# Patient Record
Sex: FEMALE | Race: WHITE | NOT HISPANIC OR LATINO | URBAN - METROPOLITAN AREA
[De-identification: names, ages, dates, MRNs, and addresses within clinical notes are randomized per-mention and may not be internally consistent; named-entity substitution may affect disease eponyms.]

---

## 2019-06-17 ENCOUNTER — OUTPATIENT (OUTPATIENT)
Dept: OUTPATIENT SERVICES | Facility: HOSPITAL | Age: 34
LOS: 1 days | Discharge: STILL ADMITTED OR ADMINISTRATIVE | End: 2019-06-17

## 2019-06-18 DIAGNOSIS — F32.9 MAJOR DEPRESSIVE DISORDER, SINGLE EPISODE, UNSPECIFIED: ICD-10-CM

## 2019-06-25 ENCOUNTER — INPATIENT (INPATIENT)
Facility: HOSPITAL | Age: 34
LOS: 11 days | Discharge: HOME | End: 2019-07-07
Attending: PSYCHIATRY & NEUROLOGY | Admitting: PSYCHIATRY & NEUROLOGY
Payer: COMMERCIAL

## 2019-06-25 VITALS
HEART RATE: 72 BPM | DIASTOLIC BLOOD PRESSURE: 92 MMHG | RESPIRATION RATE: 16 BRPM | OXYGEN SATURATION: 100 % | TEMPERATURE: 99 F | SYSTOLIC BLOOD PRESSURE: 132 MMHG

## 2019-06-25 DIAGNOSIS — F32.9 MAJOR DEPRESSIVE DISORDER, SINGLE EPISODE, UNSPECIFIED: ICD-10-CM

## 2019-06-25 LAB
ALBUMIN SERPL ELPH-MCNC: 4.4 G/DL — SIGNIFICANT CHANGE UP (ref 3.3–5)
ALP SERPL-CCNC: 78 U/L — SIGNIFICANT CHANGE UP (ref 40–120)
ALT FLD-CCNC: 18 U/L — SIGNIFICANT CHANGE UP (ref 4–33)
AMPHET UR-MCNC: NEGATIVE — SIGNIFICANT CHANGE UP
ANION GAP SERPL CALC-SCNC: 9 MMO/L — SIGNIFICANT CHANGE UP (ref 7–14)
APAP SERPL-MCNC: < 15 UG/ML — LOW (ref 15–25)
APPEARANCE UR: CLEAR — SIGNIFICANT CHANGE UP
AST SERPL-CCNC: 23 U/L — SIGNIFICANT CHANGE UP (ref 4–32)
BARBITURATES UR SCN-MCNC: NEGATIVE — SIGNIFICANT CHANGE UP
BASOPHILS # BLD AUTO: 0.02 K/UL — SIGNIFICANT CHANGE UP (ref 0–0.2)
BASOPHILS NFR BLD AUTO: 0.5 % — SIGNIFICANT CHANGE UP (ref 0–2)
BENZODIAZ UR-MCNC: NEGATIVE — SIGNIFICANT CHANGE UP
BILIRUB SERPL-MCNC: 0.5 MG/DL — SIGNIFICANT CHANGE UP (ref 0.2–1.2)
BILIRUB UR-MCNC: NEGATIVE — SIGNIFICANT CHANGE UP
BLOOD UR QL VISUAL: NEGATIVE — SIGNIFICANT CHANGE UP
BUN SERPL-MCNC: 13 MG/DL — SIGNIFICANT CHANGE UP (ref 7–23)
CALCIUM SERPL-MCNC: 9.5 MG/DL — SIGNIFICANT CHANGE UP (ref 8.4–10.5)
CANNABINOIDS UR-MCNC: NEGATIVE — SIGNIFICANT CHANGE UP
CHLORIDE SERPL-SCNC: 101 MMOL/L — SIGNIFICANT CHANGE UP (ref 98–107)
CO2 SERPL-SCNC: 29 MMOL/L — SIGNIFICANT CHANGE UP (ref 22–31)
COCAINE METAB.OTHER UR-MCNC: NEGATIVE — SIGNIFICANT CHANGE UP
COLOR SPEC: SIGNIFICANT CHANGE UP
CREAT SERPL-MCNC: 0.95 MG/DL — SIGNIFICANT CHANGE UP (ref 0.5–1.3)
EOSINOPHIL # BLD AUTO: 0.22 K/UL — SIGNIFICANT CHANGE UP (ref 0–0.5)
EOSINOPHIL NFR BLD AUTO: 5 % — SIGNIFICANT CHANGE UP (ref 0–6)
ETHANOL BLD-MCNC: < 10 MG/DL — SIGNIFICANT CHANGE UP
GLUCOSE SERPL-MCNC: 100 MG/DL — HIGH (ref 70–99)
GLUCOSE UR-MCNC: NEGATIVE — SIGNIFICANT CHANGE UP
HCG SERPL-ACNC: < 5 MIU/ML — SIGNIFICANT CHANGE UP
HCT VFR BLD CALC: 37.9 % — SIGNIFICANT CHANGE UP (ref 34.5–45)
HGB BLD-MCNC: 12.9 G/DL — SIGNIFICANT CHANGE UP (ref 11.5–15.5)
IMM GRANULOCYTES NFR BLD AUTO: 0.5 % — SIGNIFICANT CHANGE UP (ref 0–1.5)
KETONES UR-MCNC: NEGATIVE — SIGNIFICANT CHANGE UP
LEUKOCYTE ESTERASE UR-ACNC: NEGATIVE — SIGNIFICANT CHANGE UP
LYMPHOCYTES # BLD AUTO: 1.76 K/UL — SIGNIFICANT CHANGE UP (ref 1–3.3)
LYMPHOCYTES # BLD AUTO: 39.8 % — SIGNIFICANT CHANGE UP (ref 13–44)
MCHC RBC-ENTMCNC: 30.5 PG — SIGNIFICANT CHANGE UP (ref 27–34)
MCHC RBC-ENTMCNC: 34 % — SIGNIFICANT CHANGE UP (ref 32–36)
MCV RBC AUTO: 89.6 FL — SIGNIFICANT CHANGE UP (ref 80–100)
METHADONE UR-MCNC: NEGATIVE — SIGNIFICANT CHANGE UP
MONOCYTES # BLD AUTO: 0.41 K/UL — SIGNIFICANT CHANGE UP (ref 0–0.9)
MONOCYTES NFR BLD AUTO: 9.3 % — SIGNIFICANT CHANGE UP (ref 2–14)
NEUTROPHILS # BLD AUTO: 1.99 K/UL — SIGNIFICANT CHANGE UP (ref 1.8–7.4)
NEUTROPHILS NFR BLD AUTO: 44.9 % — SIGNIFICANT CHANGE UP (ref 43–77)
NITRITE UR-MCNC: NEGATIVE — SIGNIFICANT CHANGE UP
NRBC # FLD: 0.07 K/UL — SIGNIFICANT CHANGE UP (ref 0–0)
NRBC FLD-RTO: 1.6 — SIGNIFICANT CHANGE UP
OPIATES UR-MCNC: NEGATIVE — SIGNIFICANT CHANGE UP
OXYCODONE UR-MCNC: NEGATIVE — SIGNIFICANT CHANGE UP
PCP UR-MCNC: NEGATIVE — SIGNIFICANT CHANGE UP
PH UR: 8 — SIGNIFICANT CHANGE UP (ref 5–8)
PLATELET # BLD AUTO: 267 K/UL — SIGNIFICANT CHANGE UP (ref 150–400)
PMV BLD: 9.5 FL — SIGNIFICANT CHANGE UP (ref 7–13)
POTASSIUM SERPL-MCNC: 4 MMOL/L — SIGNIFICANT CHANGE UP (ref 3.5–5.3)
POTASSIUM SERPL-SCNC: 4 MMOL/L — SIGNIFICANT CHANGE UP (ref 3.5–5.3)
PROT SERPL-MCNC: 7.4 G/DL — SIGNIFICANT CHANGE UP (ref 6–8.3)
PROT UR-MCNC: NEGATIVE — SIGNIFICANT CHANGE UP
RBC # BLD: 4.23 M/UL — SIGNIFICANT CHANGE UP (ref 3.8–5.2)
RBC # FLD: 12.2 % — SIGNIFICANT CHANGE UP (ref 10.3–14.5)
SALICYLATES SERPL-MCNC: < 5 MG/DL — LOW (ref 15–30)
SODIUM SERPL-SCNC: 139 MMOL/L — SIGNIFICANT CHANGE UP (ref 135–145)
SP GR SPEC: 1.01 — SIGNIFICANT CHANGE UP (ref 1–1.04)
TSH SERPL-MCNC: 2.26 UIU/ML — SIGNIFICANT CHANGE UP (ref 0.27–4.2)
UROBILINOGEN FLD QL: NORMAL — SIGNIFICANT CHANGE UP
WBC # BLD: 4.42 K/UL — SIGNIFICANT CHANGE UP (ref 3.8–10.5)
WBC # FLD AUTO: 4.42 K/UL — SIGNIFICANT CHANGE UP (ref 3.8–10.5)

## 2019-06-25 PROCEDURE — 99285 EMERGENCY DEPT VISIT HI MDM: CPT | Mod: GC

## 2019-06-25 RX ORDER — DULOXETINE HYDROCHLORIDE 30 MG/1
20 CAPSULE, DELAYED RELEASE ORAL DAILY
Refills: 0 | Status: DISCONTINUED | OUTPATIENT
Start: 2019-06-26 | End: 2019-06-28

## 2019-06-25 RX ORDER — CLONAZEPAM 1 MG
0.5 TABLET ORAL ONCE
Refills: 0 | Status: DISCONTINUED | OUTPATIENT
Start: 2019-06-25 | End: 2019-06-25

## 2019-06-25 RX ORDER — CLONAZEPAM 1 MG
0.5 TABLET ORAL THREE TIMES A DAY
Refills: 0 | Status: DISCONTINUED | OUTPATIENT
Start: 2019-06-26 | End: 2019-07-02

## 2019-06-25 RX ORDER — DEXTROAMPHETAMINE SACCHARATE, AMPHETAMINE ASPARTATE, DEXTROAMPHETAMINE SULFATE AND AMPHETAMINE SULFATE 1.875; 1.875; 1.875; 1.875 MG/1; MG/1; MG/1; MG/1
10 TABLET ORAL
Refills: 0 | Status: DISCONTINUED | OUTPATIENT
Start: 2019-06-26 | End: 2019-07-02

## 2019-06-25 RX ORDER — ESCITALOPRAM OXALATE 10 MG/1
5 TABLET, FILM COATED ORAL DAILY
Refills: 0 | Status: DISCONTINUED | OUTPATIENT
Start: 2019-06-26 | End: 2019-06-27

## 2019-06-25 RX ORDER — PROPRANOLOL HCL 160 MG
40 CAPSULE, EXTENDED RELEASE 24HR ORAL
Refills: 0 | Status: DISCONTINUED | OUTPATIENT
Start: 2019-06-26 | End: 2019-07-07

## 2019-06-25 RX ADMIN — Medication 0.5 MILLIGRAM(S): at 21:11

## 2019-06-25 NOTE — ED ADULT NURSE NOTE - OBJECTIVE STATEMENT
Patient presents from MD Franca Allen office for SI, depression and psychiatric evaluation. Recently had thoughts of wanting to drinking antifreeze last week.

## 2019-06-25 NOTE — ED BEHAVIORAL HEALTH ASSESSMENT NOTE - HPI (INCLUDE ILLNESS QUALITY, SEVERITY, DURATION, TIMING, CONTEXT, MODIFYING FACTORS, ASSOCIATED SIGNS AND SYMPTOMS)
Pt is a 34 year-old F with PPHx of MDD, PTSD, borderline personality disorder, with 2 prior psychiatric hospitalizations (most recent at [facility] 6/8/19 - 6/14/19 for depression), no prior suicide attempts, history of NSSIB via superficial cutting, no PMH, no history of substance abuse, who was brought in by EMS from therapist's office after reporting to her worsening symptoms of depression.  Pt seen and evaluated.  She reports that last week she went to [facility] crisis clinic to obtain refill of medications as she would not be able to see a psychiatrist at [facility] until July.  Reports that when she went to  medications at her pharmacy the next day Pt is a 34 year-old F with PPHx of MDD, PTSD, borderline personality disorder, with 2 prior psychiatric hospitalizations (most recent at [facility] 6/8/19 - 6/14/19 for depression), no prior suicide attempts, history of NSSIB via superficial cutting, no PMH, no history of substance abuse, who was brought in by EMS from therapist's office after reporting to her worsening symptoms of depression.  Pt seen and evaluated.  She reports that last week she went to [facility] crisis clinic to obtain refill of medications as she would not be able to see a psychiatrist at [facility] until July.  Reports that when she went to  medications at her pharmacy the next day she saw a bottle of antifreeze and recalled from a TV show that drinking antifreeze "would make you sick" and so she had thoughts of drinking antifreeze with purpose of becoming sick to be hospitalized however decided against it after reading that it could cause blindness.  Reports she attended group therapy at [facility] the following day, was crying a lot and spoke to therapist who recommended she go to ER,  had informed that he would take her however they went home and went to bed.  Reports she spent weekend at "[employer]" festival which she reports enjoying however that night [facility] as she continued to feel depressed. Pt is a 34 year-old F with PPHx of MDD, PTSD, borderline personality disorder, with 2 prior psychiatric hospitalizations (most recent at [facility] 6/8/19 - 6/14/19 for depression), no prior suicide attempts, history of NSSIB via superficial cutting, no PMH, no history of substance abuse, who was brought in by EMS from therapist's office after reporting to her worsening symptoms of depression.  Pt seen and evaluated.  She reports that last week she went to [facility] crisis clinic to obtain refill of medications as she would not be able to see a psychiatrist at [facility] until July.  Reports that when she went to  medications at her pharmacy the next day she saw a bottle of antifreeze and recalled from a TV show that drinking antifreeze "would make you sick" and so she had thoughts of drinking antifreeze with purpose of becoming sick to be hospitalized however decided against it after reading that it could cause blindness.  Reports she attended group therapy at [facility] the following day, was crying a lot and spoke to therapist who recommended she go to ER,  had informed that he would take her however they went home and went to bed.  Reports she spent weekend at "[employer]" festival which she reports enjoying however that night called Pending sale to [facility] WELL as she continued to feel depressed.  Reports she saw her therapist today and informed her of events of last week and how she feels unsupported by  and therapist informed her she felt she should go to ER.  In ER pt denies passive and active SI/I/P; reports intermittent passive suicidal ideation in last 7 days.  Reports initial insomnia and decreased appetite as well.    See ED Behavioral Health note for collateral from therapist.  Briefly, therapist has concerns for patient's safety and feels she has decompensated in last week.

## 2019-06-25 NOTE — ED ADULT NURSE REASSESSMENT NOTE - NS ED NURSE REASSESS COMMENT FT1
Patient refusing to change into gown and pants provided for patient protocol , spoke with MIRELLA Billy and patient will be seen in [facility] with PES at bedside currently. MD Redmnod aware.
Patient transported to [address] via EMS, safety maintained.
No

## 2019-06-25 NOTE — ED BEHAVIORAL HEALTH ASSESSMENT NOTE - OTHER PAST PSYCHIATRIC HISTORY (INCLUDE DETAILS REGARDING ONSET, COURSE OF ILLNESS, INPATIENT/OUTPATIENT TREATMENT)
2 prior psychiatric hospitalizations (first in February 2019 at [place], second 6/8/19-6/14/19 at [facility]).  No prior suicide attempts.  Currently in outpt treatment at [facility], however has not been assigned a psychiatrist yet.

## 2019-06-25 NOTE — ED BEHAVIORAL HEALTH ASSESSMENT NOTE - CASE SUMMARY
35 yo F hx of depression and borderline personality disorder, hx of NSSIB, 2 prior inpatient psychiatric hospitalizations.  Pt presented to ED on recommendation of therapist for inpatient psychiatric hospitalization.  Writer spoke directly with therapist who reports patient has been expressing intermittent suicidal ideation with ambivalent intent and purchased antifreeze.  Pt has been spending evenings holding knives with thoughts of cutting self.  Patient reported to staff in ED that she only looked at antifreeze at the store.  There are various inconsistencies in patient's report and therapist confirms that patient is often inconsistent.  Therapist is concerned for patient's safety at this time.  Pt has been unable to sustain stable, good mood with absence of suicidal ideation and consistent safety planning.  In the ED patient endorses fluctuating moods and at times wanting to "disappear."  She is unable to engage in safety planning and has very limited social supports. Consideration was given to higher level of care in form of partial hospitalization however there is a 3-week wait list.   Given patient's instability, suicidality, and absence of benefit from outpatient level of care, she presents an imminent risk to herself and requires acute inpatient psychiatric hospitalization for safety and stabilization at this time.

## 2019-06-25 NOTE — ED PROVIDER NOTE - OBJECTIVE STATEMENT
34 yr old female with PMH of psych hx presents with SI on Wednesday.  States was at store and saw antifreeze and read back where it said ingestion would be fatal or lead to blindness.  She said based on blindness she "nixed" that idea.  States that night cut her wrist.  Went to group the next day where she spoke to therapist about it who recommended she come to ER which she did not.  Brought up to therapist today who told her to come in.  Denies SI currently.

## 2019-06-25 NOTE — ED BEHAVIORAL HEALTH ASSESSMENT NOTE - DESCRIPTION
Anxious, but cooperative.  Klonopin 0.5mg PO x1 given for anxiety.    Vital Signs Last 24 Hrs  T(C): 37.1 (25 Jun 2019 17:26), Max: 37.1 (25 Jun 2019 17:26)  T(F): 98.7 (25 Jun 2019 17:26), Max: 98.7 (25 Jun 2019 17:26)  HR: 72 (25 Jun 2019 17:26) (72 - 72)  BP: 132/92 (25 Jun 2019 17:26) (132/92 - 132/92)  BP(mean): --  RR: 16 (25 Jun 2019 17:26) (16 - 16)  SpO2: 100% (25 Jun 2019 17:26) (100% - 100%) Denies Currently lives with .  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

## 2019-06-25 NOTE — ED ADULT NURSE NOTE - NSIMPLEMENTINTERV_GEN_ALL_ED
Implemented All Universal Safety Interventions:  [place] to call system. Call bell, personal items and telephone within reach. Instruct patient to call for assistance. Room bathroom lighting operational. Non-slip footwear when patient is off stretcher. Physically safe environment: no spills, clutter or unnecessary equipment. Stretcher in lowest position, wheels locked, appropriate side rails in place.

## 2019-06-25 NOTE — ED BEHAVIORAL HEALTH ASSESSMENT NOTE - RISK ASSESSMENT
Pt with high risk of harm to self given recent suicidal ideation with plan to drink antifreeze and inability to care for self, poor social supports, not engaged in work or school.

## 2019-06-25 NOTE — ED PROVIDER NOTE - CLINICAL SUMMARY MEDICAL DECISION MAKING FREE TEXT BOX
no acute medical complaints; given SI last week and what is what prompted her to get sent in will have psych attending evaluate the pt

## 2019-06-25 NOTE — ED BEHAVIORAL HEALTH ASSESSMENT NOTE - SUMMARY
Pt is a 34 year-old F with PPHx of MDD, PTSD, borderline personality disorder, with 2 prior psychiatric hospitalizations (most recent at [facility] 6/8/19 - 6/14/19 for depression), no prior suicide attempts, history of NSSIB via superficial cutting, no PMH, no history of substance abuse, who was brought in by EMS from therapist's office after reporting to her worsening symptoms of depression since pt was discharged from [facility], reports symptoms of insomnia and poor appetite with fleeting suicidal ideation and reporting inconsistent story compared to report received by therapist who confirms pt repeatedly changes story and confirmed pt told her that she was in possession of antifreeze.  Has concerns for safety.  Case discussed with attending Dr. Lauren.  Pt to be hospitalized for stabilization and safety.
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
## 2019-06-25 NOTE — ED BEHAVIORAL HEALTH ASSESSMENT NOTE - CURRENT MEDICATION
Adderall 10mg TID, Propranolol 40mg BID, Cymbalta 20mg daily, Wellbutrin XL 300mg daily, Lexapro 5mg daily, Klonopin 0.5mg TID Adderall 10mg TID, Propranolol 40mg BID, Cymbalta 20mg daily, Wellbutrin XL 300mg daily, Lexapro 5mg daily, Klonopin 0.5mg TID (confirmed by ISTOP).
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
## 2019-06-26 PROCEDURE — 99222 1ST HOSP IP/OBS MODERATE 55: CPT

## 2019-06-26 RX ADMIN — DEXTROAMPHETAMINE SACCHARATE, AMPHETAMINE ASPARTATE, DEXTROAMPHETAMINE SULFATE AND AMPHETAMINE SULFATE 10 MILLIGRAM(S): 1.875; 1.875; 1.875; 1.875 TABLET ORAL at 09:15

## 2019-06-26 RX ADMIN — Medication 0.5 MILLIGRAM(S): at 12:17

## 2019-06-26 RX ADMIN — Medication 0.5 MILLIGRAM(S): at 09:15

## 2019-06-26 RX ADMIN — DULOXETINE HYDROCHLORIDE 20 MILLIGRAM(S): 30 CAPSULE, DELAYED RELEASE ORAL at 09:15

## 2019-06-26 RX ADMIN — Medication 40 MILLIGRAM(S): at 09:15

## 2019-06-26 RX ADMIN — ESCITALOPRAM OXALATE 5 MILLIGRAM(S): 10 TABLET, FILM COATED ORAL at 09:15

## 2019-06-26 RX ADMIN — Medication 40 MILLIGRAM(S): at 22:19

## 2019-06-26 RX ADMIN — DEXTROAMPHETAMINE SACCHARATE, AMPHETAMINE ASPARTATE, DEXTROAMPHETAMINE SULFATE AND AMPHETAMINE SULFATE 10 MILLIGRAM(S): 1.875; 1.875; 1.875; 1.875 TABLET ORAL at 12:16

## 2019-06-26 RX ADMIN — DEXTROAMPHETAMINE SACCHARATE, AMPHETAMINE ASPARTATE, DEXTROAMPHETAMINE SULFATE AND AMPHETAMINE SULFATE 10 MILLIGRAM(S): 1.875; 1.875; 1.875; 1.875 TABLET ORAL at 16:33

## 2019-06-26 RX ADMIN — Medication 0.5 MILLIGRAM(S): at 22:18

## 2019-06-27 PROCEDURE — 99232 SBSQ HOSP IP/OBS MODERATE 35: CPT

## 2019-06-27 RX ORDER — BUPROPION HYDROCHLORIDE 150 MG/1
300 TABLET, EXTENDED RELEASE ORAL ONCE
Refills: 0 | Status: COMPLETED | OUTPATIENT
Start: 2019-06-27 | End: 2019-06-27

## 2019-06-27 RX ORDER — BUPROPION HYDROCHLORIDE 150 MG/1
300 TABLET, EXTENDED RELEASE ORAL DAILY
Refills: 0 | Status: DISCONTINUED | OUTPATIENT
Start: 2019-06-28 | End: 2019-07-07

## 2019-06-27 RX ORDER — SERTRALINE 25 MG/1
12.5 TABLET, FILM COATED ORAL DAILY
Refills: 0 | Status: DISCONTINUED | OUTPATIENT
Start: 2019-06-28 | End: 2019-06-28

## 2019-06-27 RX ADMIN — DEXTROAMPHETAMINE SACCHARATE, AMPHETAMINE ASPARTATE, DEXTROAMPHETAMINE SULFATE AND AMPHETAMINE SULFATE 10 MILLIGRAM(S): 1.875; 1.875; 1.875; 1.875 TABLET ORAL at 09:49

## 2019-06-27 RX ADMIN — Medication 0.5 MILLIGRAM(S): at 21:44

## 2019-06-27 RX ADMIN — DEXTROAMPHETAMINE SACCHARATE, AMPHETAMINE ASPARTATE, DEXTROAMPHETAMINE SULFATE AND AMPHETAMINE SULFATE 10 MILLIGRAM(S): 1.875; 1.875; 1.875; 1.875 TABLET ORAL at 13:14

## 2019-06-27 RX ADMIN — Medication 0.5 MILLIGRAM(S): at 09:49

## 2019-06-27 RX ADMIN — BUPROPION HYDROCHLORIDE 300 MILLIGRAM(S): 150 TABLET, EXTENDED RELEASE ORAL at 13:14

## 2019-06-27 RX ADMIN — DULOXETINE HYDROCHLORIDE 20 MILLIGRAM(S): 30 CAPSULE, DELAYED RELEASE ORAL at 09:49

## 2019-06-27 RX ADMIN — Medication 40 MILLIGRAM(S): at 09:49

## 2019-06-27 RX ADMIN — Medication 40 MILLIGRAM(S): at 21:44

## 2019-06-27 RX ADMIN — Medication 0.5 MILLIGRAM(S): at 13:14

## 2019-06-27 RX ADMIN — DEXTROAMPHETAMINE SACCHARATE, AMPHETAMINE ASPARTATE, DEXTROAMPHETAMINE SULFATE AND AMPHETAMINE SULFATE 10 MILLIGRAM(S): 1.875; 1.875; 1.875; 1.875 TABLET ORAL at 17:16

## 2019-06-27 RX ADMIN — ESCITALOPRAM OXALATE 5 MILLIGRAM(S): 10 TABLET, FILM COATED ORAL at 09:49
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
## 2019-06-28 PROCEDURE — 99232 SBSQ HOSP IP/OBS MODERATE 35: CPT

## 2019-06-28 RX ORDER — SERTRALINE 25 MG/1
50 TABLET, FILM COATED ORAL DAILY
Refills: 0 | Status: DISCONTINUED | OUTPATIENT
Start: 2019-06-30 | End: 2019-07-02

## 2019-06-28 RX ORDER — SERTRALINE 25 MG/1
25 TABLET, FILM COATED ORAL DAILY
Refills: 0 | Status: COMPLETED | OUTPATIENT
Start: 2019-06-29 | End: 2019-06-29

## 2019-06-28 RX ADMIN — DEXTROAMPHETAMINE SACCHARATE, AMPHETAMINE ASPARTATE, DEXTROAMPHETAMINE SULFATE AND AMPHETAMINE SULFATE 10 MILLIGRAM(S): 1.875; 1.875; 1.875; 1.875 TABLET ORAL at 08:59

## 2019-06-28 RX ADMIN — Medication 0.5 MILLIGRAM(S): at 20:36

## 2019-06-28 RX ADMIN — DEXTROAMPHETAMINE SACCHARATE, AMPHETAMINE ASPARTATE, DEXTROAMPHETAMINE SULFATE AND AMPHETAMINE SULFATE 10 MILLIGRAM(S): 1.875; 1.875; 1.875; 1.875 TABLET ORAL at 12:25

## 2019-06-28 RX ADMIN — BUPROPION HYDROCHLORIDE 300 MILLIGRAM(S): 150 TABLET, EXTENDED RELEASE ORAL at 09:00

## 2019-06-28 RX ADMIN — DULOXETINE HYDROCHLORIDE 20 MILLIGRAM(S): 30 CAPSULE, DELAYED RELEASE ORAL at 09:00

## 2019-06-28 RX ADMIN — Medication 40 MILLIGRAM(S): at 09:00

## 2019-06-28 RX ADMIN — SERTRALINE 12.5 MILLIGRAM(S): 25 TABLET, FILM COATED ORAL at 09:00

## 2019-06-28 RX ADMIN — DEXTROAMPHETAMINE SACCHARATE, AMPHETAMINE ASPARTATE, DEXTROAMPHETAMINE SULFATE AND AMPHETAMINE SULFATE 10 MILLIGRAM(S): 1.875; 1.875; 1.875; 1.875 TABLET ORAL at 16:27

## 2019-06-28 RX ADMIN — Medication 40 MILLIGRAM(S): at 20:36

## 2019-06-28 RX ADMIN — Medication 0.5 MILLIGRAM(S): at 09:00

## 2019-06-28 RX ADMIN — Medication 0.5 MILLIGRAM(S): at 13:18

## 2019-06-29 PROCEDURE — 99231 SBSQ HOSP IP/OBS SF/LOW 25: CPT

## 2019-06-29 RX ADMIN — Medication 40 MILLIGRAM(S): at 09:16

## 2019-06-29 RX ADMIN — DEXTROAMPHETAMINE SACCHARATE, AMPHETAMINE ASPARTATE, DEXTROAMPHETAMINE SULFATE AND AMPHETAMINE SULFATE 10 MILLIGRAM(S): 1.875; 1.875; 1.875; 1.875 TABLET ORAL at 16:22

## 2019-06-29 RX ADMIN — Medication 0.5 MILLIGRAM(S): at 20:59

## 2019-06-29 RX ADMIN — DEXTROAMPHETAMINE SACCHARATE, AMPHETAMINE ASPARTATE, DEXTROAMPHETAMINE SULFATE AND AMPHETAMINE SULFATE 10 MILLIGRAM(S): 1.875; 1.875; 1.875; 1.875 TABLET ORAL at 09:15

## 2019-06-29 RX ADMIN — DEXTROAMPHETAMINE SACCHARATE, AMPHETAMINE ASPARTATE, DEXTROAMPHETAMINE SULFATE AND AMPHETAMINE SULFATE 10 MILLIGRAM(S): 1.875; 1.875; 1.875; 1.875 TABLET ORAL at 11:59

## 2019-06-29 RX ADMIN — Medication 0.5 MILLIGRAM(S): at 12:34

## 2019-06-29 RX ADMIN — SERTRALINE 25 MILLIGRAM(S): 25 TABLET, FILM COATED ORAL at 09:15

## 2019-06-29 RX ADMIN — Medication 40 MILLIGRAM(S): at 20:59

## 2019-06-29 RX ADMIN — Medication 0.5 MILLIGRAM(S): at 09:15

## 2019-06-29 RX ADMIN — BUPROPION HYDROCHLORIDE 300 MILLIGRAM(S): 150 TABLET, EXTENDED RELEASE ORAL at 09:15

## 2019-06-30 PROCEDURE — 99231 SBSQ HOSP IP/OBS SF/LOW 25: CPT

## 2019-06-30 RX ADMIN — DEXTROAMPHETAMINE SACCHARATE, AMPHETAMINE ASPARTATE, DEXTROAMPHETAMINE SULFATE AND AMPHETAMINE SULFATE 10 MILLIGRAM(S): 1.875; 1.875; 1.875; 1.875 TABLET ORAL at 09:45

## 2019-06-30 RX ADMIN — DEXTROAMPHETAMINE SACCHARATE, AMPHETAMINE ASPARTATE, DEXTROAMPHETAMINE SULFATE AND AMPHETAMINE SULFATE 10 MILLIGRAM(S): 1.875; 1.875; 1.875; 1.875 TABLET ORAL at 17:25

## 2019-06-30 RX ADMIN — Medication 1 MILLIGRAM(S): at 18:17

## 2019-06-30 RX ADMIN — Medication 40 MILLIGRAM(S): at 09:45

## 2019-06-30 RX ADMIN — Medication 0.5 MILLIGRAM(S): at 09:45

## 2019-06-30 RX ADMIN — Medication 0.5 MILLIGRAM(S): at 12:53

## 2019-06-30 RX ADMIN — DEXTROAMPHETAMINE SACCHARATE, AMPHETAMINE ASPARTATE, DEXTROAMPHETAMINE SULFATE AND AMPHETAMINE SULFATE 10 MILLIGRAM(S): 1.875; 1.875; 1.875; 1.875 TABLET ORAL at 12:53

## 2019-06-30 RX ADMIN — Medication 40 MILLIGRAM(S): at 21:02

## 2019-06-30 RX ADMIN — Medication 0.5 MILLIGRAM(S): at 21:03

## 2019-06-30 RX ADMIN — SERTRALINE 50 MILLIGRAM(S): 25 TABLET, FILM COATED ORAL at 09:45

## 2019-06-30 RX ADMIN — BUPROPION HYDROCHLORIDE 300 MILLIGRAM(S): 150 TABLET, EXTENDED RELEASE ORAL at 09:45

## 2019-07-01 PROCEDURE — 99232 SBSQ HOSP IP/OBS MODERATE 35: CPT

## 2019-07-01 RX ADMIN — SERTRALINE 50 MILLIGRAM(S): 25 TABLET, FILM COATED ORAL at 09:21

## 2019-07-01 RX ADMIN — Medication 0.5 MILLIGRAM(S): at 13:01

## 2019-07-01 RX ADMIN — Medication 0.5 MILLIGRAM(S): at 09:21

## 2019-07-01 RX ADMIN — DEXTROAMPHETAMINE SACCHARATE, AMPHETAMINE ASPARTATE, DEXTROAMPHETAMINE SULFATE AND AMPHETAMINE SULFATE 10 MILLIGRAM(S): 1.875; 1.875; 1.875; 1.875 TABLET ORAL at 09:21

## 2019-07-01 RX ADMIN — Medication 40 MILLIGRAM(S): at 09:21

## 2019-07-01 RX ADMIN — DEXTROAMPHETAMINE SACCHARATE, AMPHETAMINE ASPARTATE, DEXTROAMPHETAMINE SULFATE AND AMPHETAMINE SULFATE 10 MILLIGRAM(S): 1.875; 1.875; 1.875; 1.875 TABLET ORAL at 16:22

## 2019-07-01 RX ADMIN — Medication 40 MILLIGRAM(S): at 22:15

## 2019-07-01 RX ADMIN — DEXTROAMPHETAMINE SACCHARATE, AMPHETAMINE ASPARTATE, DEXTROAMPHETAMINE SULFATE AND AMPHETAMINE SULFATE 10 MILLIGRAM(S): 1.875; 1.875; 1.875; 1.875 TABLET ORAL at 11:34

## 2019-07-01 RX ADMIN — BUPROPION HYDROCHLORIDE 300 MILLIGRAM(S): 150 TABLET, EXTENDED RELEASE ORAL at 09:21

## 2019-07-01 RX ADMIN — Medication 0.5 MILLIGRAM(S): at 22:15

## 2019-07-02 PROCEDURE — 99232 SBSQ HOSP IP/OBS MODERATE 35: CPT

## 2019-07-02 RX ORDER — CLONAZEPAM 1 MG
0.5 TABLET ORAL THREE TIMES A DAY
Refills: 0 | Status: DISCONTINUED | OUTPATIENT
Start: 2019-07-02 | End: 2019-07-07

## 2019-07-02 RX ORDER — SERTRALINE 25 MG/1
25 TABLET, FILM COATED ORAL ONCE
Refills: 0 | Status: COMPLETED | OUTPATIENT
Start: 2019-07-02 | End: 2019-07-02

## 2019-07-02 RX ORDER — ACETAMINOPHEN/CAFFEINE 500MG-65MG
1 TABLET ORAL EVERY 8 HOURS
Refills: 0 | Status: DISCONTINUED | OUTPATIENT
Start: 2019-07-02 | End: 2019-07-07

## 2019-07-02 RX ORDER — DEXTROAMPHETAMINE SACCHARATE, AMPHETAMINE ASPARTATE, DEXTROAMPHETAMINE SULFATE AND AMPHETAMINE SULFATE 1.875; 1.875; 1.875; 1.875 MG/1; MG/1; MG/1; MG/1
10 TABLET ORAL
Refills: 0 | Status: DISCONTINUED | OUTPATIENT
Start: 2019-07-02 | End: 2019-07-07

## 2019-07-02 RX ORDER — SERTRALINE 25 MG/1
75 TABLET, FILM COATED ORAL DAILY
Refills: 0 | Status: DISCONTINUED | OUTPATIENT
Start: 2019-07-03 | End: 2019-07-03

## 2019-07-02 RX ADMIN — Medication 0.5 MILLIGRAM(S): at 21:08

## 2019-07-02 RX ADMIN — Medication 40 MILLIGRAM(S): at 22:21

## 2019-07-02 RX ADMIN — Medication 0.5 MILLIGRAM(S): at 08:44

## 2019-07-02 RX ADMIN — BUPROPION HYDROCHLORIDE 300 MILLIGRAM(S): 150 TABLET, EXTENDED RELEASE ORAL at 08:44

## 2019-07-02 RX ADMIN — DEXTROAMPHETAMINE SACCHARATE, AMPHETAMINE ASPARTATE, DEXTROAMPHETAMINE SULFATE AND AMPHETAMINE SULFATE 10 MILLIGRAM(S): 1.875; 1.875; 1.875; 1.875 TABLET ORAL at 08:44

## 2019-07-02 RX ADMIN — Medication 0.5 MILLIGRAM(S): at 13:06

## 2019-07-02 RX ADMIN — SERTRALINE 50 MILLIGRAM(S): 25 TABLET, FILM COATED ORAL at 08:44

## 2019-07-02 RX ADMIN — DEXTROAMPHETAMINE SACCHARATE, AMPHETAMINE ASPARTATE, DEXTROAMPHETAMINE SULFATE AND AMPHETAMINE SULFATE 10 MILLIGRAM(S): 1.875; 1.875; 1.875; 1.875 TABLET ORAL at 15:53

## 2019-07-02 RX ADMIN — Medication 40 MILLIGRAM(S): at 08:44

## 2019-07-02 RX ADMIN — DEXTROAMPHETAMINE SACCHARATE, AMPHETAMINE ASPARTATE, DEXTROAMPHETAMINE SULFATE AND AMPHETAMINE SULFATE 10 MILLIGRAM(S): 1.875; 1.875; 1.875; 1.875 TABLET ORAL at 11:52

## 2019-07-02 RX ADMIN — SERTRALINE 25 MILLIGRAM(S): 25 TABLET, FILM COATED ORAL at 13:06

## 2019-07-03 PROCEDURE — 99232 SBSQ HOSP IP/OBS MODERATE 35: CPT

## 2019-07-03 RX ORDER — SERTRALINE 25 MG/1
100 TABLET, FILM COATED ORAL DAILY
Refills: 0 | Status: DISCONTINUED | OUTPATIENT
Start: 2019-07-04 | End: 2019-07-05

## 2019-07-03 RX ADMIN — Medication 0.5 MILLIGRAM(S): at 21:50

## 2019-07-03 RX ADMIN — DEXTROAMPHETAMINE SACCHARATE, AMPHETAMINE ASPARTATE, DEXTROAMPHETAMINE SULFATE AND AMPHETAMINE SULFATE 10 MILLIGRAM(S): 1.875; 1.875; 1.875; 1.875 TABLET ORAL at 16:36

## 2019-07-03 RX ADMIN — Medication 0.5 MILLIGRAM(S): at 12:48

## 2019-07-03 RX ADMIN — Medication 40 MILLIGRAM(S): at 09:08

## 2019-07-03 RX ADMIN — DEXTROAMPHETAMINE SACCHARATE, AMPHETAMINE ASPARTATE, DEXTROAMPHETAMINE SULFATE AND AMPHETAMINE SULFATE 10 MILLIGRAM(S): 1.875; 1.875; 1.875; 1.875 TABLET ORAL at 09:08

## 2019-07-03 RX ADMIN — Medication 1 MILLIGRAM(S): at 17:53

## 2019-07-03 RX ADMIN — DEXTROAMPHETAMINE SACCHARATE, AMPHETAMINE ASPARTATE, DEXTROAMPHETAMINE SULFATE AND AMPHETAMINE SULFATE 10 MILLIGRAM(S): 1.875; 1.875; 1.875; 1.875 TABLET ORAL at 12:17

## 2019-07-03 RX ADMIN — Medication 0.5 MILLIGRAM(S): at 09:08

## 2019-07-03 RX ADMIN — BUPROPION HYDROCHLORIDE 300 MILLIGRAM(S): 150 TABLET, EXTENDED RELEASE ORAL at 09:08

## 2019-07-03 RX ADMIN — SERTRALINE 75 MILLIGRAM(S): 25 TABLET, FILM COATED ORAL at 09:08

## 2019-07-03 RX ADMIN — Medication 40 MILLIGRAM(S): at 21:50

## 2019-07-04 PROCEDURE — 99232 SBSQ HOSP IP/OBS MODERATE 35: CPT

## 2019-07-04 RX ADMIN — Medication 0.5 MILLIGRAM(S): at 21:40

## 2019-07-04 RX ADMIN — Medication 40 MILLIGRAM(S): at 21:40

## 2019-07-05 PROCEDURE — 99238 HOSP IP/OBS DSCHRG MGMT 30/<: CPT

## 2019-07-05 PROCEDURE — 73130 X-RAY EXAM OF HAND: CPT | Mod: 26,RT

## 2019-07-05 RX ORDER — SERTRALINE 25 MG/1
3 TABLET, FILM COATED ORAL
Qty: 42 | Refills: 0
Start: 2019-07-05 | End: 2019-07-18

## 2019-07-05 RX ORDER — ESCITALOPRAM OXALATE 10 MG/1
1 TABLET, FILM COATED ORAL
Qty: 0 | Refills: 0 | DISCHARGE

## 2019-07-05 RX ORDER — PROPRANOLOL HCL 160 MG
1 CAPSULE, EXTENDED RELEASE 24HR ORAL
Qty: 28 | Refills: 0
Start: 2019-07-05 | End: 2019-07-18

## 2019-07-05 RX ORDER — DULOXETINE HYDROCHLORIDE 30 MG/1
1 CAPSULE, DELAYED RELEASE ORAL
Qty: 0 | Refills: 0 | DISCHARGE

## 2019-07-05 RX ORDER — SERTRALINE 25 MG/1
1 TABLET, FILM COATED ORAL
Qty: 14 | Refills: 0
Start: 2019-07-05 | End: 2019-07-18

## 2019-07-05 RX ORDER — PROPRANOLOL HCL 160 MG
1 CAPSULE, EXTENDED RELEASE 24HR ORAL
Qty: 0 | Refills: 0 | DISCHARGE

## 2019-07-05 RX ORDER — BUPROPION HYDROCHLORIDE 150 MG/1
1 TABLET, EXTENDED RELEASE ORAL
Qty: 14 | Refills: 0
Start: 2019-07-05 | End: 2019-07-18

## 2019-07-05 RX ORDER — CLONAZEPAM 1 MG
1 TABLET ORAL
Qty: 42 | Refills: 0
Start: 2019-07-05 | End: 2019-07-18

## 2019-07-05 RX ORDER — SERTRALINE 25 MG/1
75 TABLET, FILM COATED ORAL DAILY
Refills: 0 | Status: DISCONTINUED | OUTPATIENT
Start: 2019-07-05 | End: 2019-07-05

## 2019-07-05 RX ORDER — SERTRALINE 25 MG/1
100 TABLET, FILM COATED ORAL DAILY
Refills: 0 | Status: DISCONTINUED | OUTPATIENT
Start: 2019-07-06 | End: 2019-07-07

## 2019-07-05 RX ORDER — ESCITALOPRAM OXALATE 10 MG/1
5 TABLET, FILM COATED ORAL DAILY
Refills: 0 | Status: DISCONTINUED | OUTPATIENT
Start: 2019-07-05 | End: 2019-07-07

## 2019-07-05 RX ORDER — DEXTROAMPHETAMINE SACCHARATE, AMPHETAMINE ASPARTATE, DEXTROAMPHETAMINE SULFATE AND AMPHETAMINE SULFATE 1.875; 1.875; 1.875; 1.875 MG/1; MG/1; MG/1; MG/1
1 TABLET ORAL
Qty: 0 | Refills: 0 | DISCHARGE

## 2019-07-05 RX ORDER — DEXTROAMPHETAMINE SACCHARATE, AMPHETAMINE ASPARTATE, DEXTROAMPHETAMINE SULFATE AND AMPHETAMINE SULFATE 1.875; 1.875; 1.875; 1.875 MG/1; MG/1; MG/1; MG/1
1 TABLET ORAL
Qty: 42 | Refills: 0
Start: 2019-07-05 | End: 2019-07-18

## 2019-07-05 RX ORDER — NORGESTIMATE AND ETHINYL ESTRADIOL 7DAYSX3 LO
1 KIT ORAL
Qty: 0 | Refills: 0 | DISCHARGE

## 2019-07-05 RX ORDER — DEXTROAMPHETAMINE SACCHARATE, AMPHETAMINE ASPARTATE, DEXTROAMPHETAMINE SULFATE AND AMPHETAMINE SULFATE 1.875; 1.875; 1.875; 1.875 MG/1; MG/1; MG/1; MG/1
1 TABLET ORAL
Qty: 0 | Refills: 0 | DISCHARGE
Start: 2019-07-05

## 2019-07-05 RX ORDER — CLONAZEPAM 1 MG
1 TABLET ORAL
Qty: 0 | Refills: 0 | DISCHARGE

## 2019-07-05 RX ORDER — BUPROPION HYDROCHLORIDE 150 MG/1
1 TABLET, EXTENDED RELEASE ORAL
Qty: 0 | Refills: 0 | DISCHARGE

## 2019-07-05 RX ADMIN — Medication 40 MILLIGRAM(S): at 20:54

## 2019-07-05 RX ADMIN — SERTRALINE 100 MILLIGRAM(S): 25 TABLET, FILM COATED ORAL at 05:30

## 2019-07-05 RX ADMIN — BUPROPION HYDROCHLORIDE 300 MILLIGRAM(S): 150 TABLET, EXTENDED RELEASE ORAL at 05:30

## 2019-07-05 RX ADMIN — Medication 0.5 MILLIGRAM(S): at 20:54

## 2019-07-05 RX ADMIN — Medication 1 TABLET(S): at 17:26

## 2019-07-05 RX ADMIN — DEXTROAMPHETAMINE SACCHARATE, AMPHETAMINE ASPARTATE, DEXTROAMPHETAMINE SULFATE AND AMPHETAMINE SULFATE 10 MILLIGRAM(S): 1.875; 1.875; 1.875; 1.875 TABLET ORAL at 08:46

## 2019-07-05 RX ADMIN — Medication 1 TABLET(S): at 16:26

## 2019-07-05 RX ADMIN — Medication 0.5 MILLIGRAM(S): at 08:46

## 2019-07-05 RX ADMIN — Medication 40 MILLIGRAM(S): at 08:46

## 2019-07-05 RX ADMIN — DEXTROAMPHETAMINE SACCHARATE, AMPHETAMINE ASPARTATE, DEXTROAMPHETAMINE SULFATE AND AMPHETAMINE SULFATE 10 MILLIGRAM(S): 1.875; 1.875; 1.875; 1.875 TABLET ORAL at 12:43

## 2019-07-05 RX ADMIN — DEXTROAMPHETAMINE SACCHARATE, AMPHETAMINE ASPARTATE, DEXTROAMPHETAMINE SULFATE AND AMPHETAMINE SULFATE 10 MILLIGRAM(S): 1.875; 1.875; 1.875; 1.875 TABLET ORAL at 16:25

## 2019-07-05 RX ADMIN — ESCITALOPRAM OXALATE 5 MILLIGRAM(S): 10 TABLET, FILM COATED ORAL at 18:30

## 2019-07-05 RX ADMIN — Medication 0.5 MILLIGRAM(S): at 12:43

## 2019-07-06 RX ADMIN — Medication 40 MILLIGRAM(S): at 08:17

## 2019-07-06 RX ADMIN — Medication 0.5 MILLIGRAM(S): at 08:17

## 2019-07-06 RX ADMIN — DEXTROAMPHETAMINE SACCHARATE, AMPHETAMINE ASPARTATE, DEXTROAMPHETAMINE SULFATE AND AMPHETAMINE SULFATE 10 MILLIGRAM(S): 1.875; 1.875; 1.875; 1.875 TABLET ORAL at 15:59

## 2019-07-06 RX ADMIN — Medication 1 TABLET(S): at 16:40

## 2019-07-06 RX ADMIN — Medication 1 MILLIGRAM(S): at 22:51

## 2019-07-06 RX ADMIN — DEXTROAMPHETAMINE SACCHARATE, AMPHETAMINE ASPARTATE, DEXTROAMPHETAMINE SULFATE AND AMPHETAMINE SULFATE 10 MILLIGRAM(S): 1.875; 1.875; 1.875; 1.875 TABLET ORAL at 08:17

## 2019-07-06 RX ADMIN — Medication 40 MILLIGRAM(S): at 20:24

## 2019-07-06 RX ADMIN — Medication 1 TABLET(S): at 15:45

## 2019-07-06 RX ADMIN — Medication 0.5 MILLIGRAM(S): at 20:24

## 2019-07-06 RX ADMIN — ESCITALOPRAM OXALATE 5 MILLIGRAM(S): 10 TABLET, FILM COATED ORAL at 11:15

## 2019-07-06 RX ADMIN — Medication 0.5 MILLIGRAM(S): at 13:01

## 2019-07-06 RX ADMIN — BUPROPION HYDROCHLORIDE 300 MILLIGRAM(S): 150 TABLET, EXTENDED RELEASE ORAL at 08:17

## 2019-07-06 RX ADMIN — SERTRALINE 100 MILLIGRAM(S): 25 TABLET, FILM COATED ORAL at 08:17

## 2019-07-06 RX ADMIN — DEXTROAMPHETAMINE SACCHARATE, AMPHETAMINE ASPARTATE, DEXTROAMPHETAMINE SULFATE AND AMPHETAMINE SULFATE 10 MILLIGRAM(S): 1.875; 1.875; 1.875; 1.875 TABLET ORAL at 12:17

## 2019-07-07 VITALS — HEART RATE: 62 BPM | TEMPERATURE: 98 F | DIASTOLIC BLOOD PRESSURE: 62 MMHG | SYSTOLIC BLOOD PRESSURE: 111 MMHG

## 2019-07-07 PROCEDURE — 99238 HOSP IP/OBS DSCHRG MGMT 30/<: CPT

## 2019-07-07 RX ORDER — ESCITALOPRAM OXALATE 10 MG/1
1 TABLET, FILM COATED ORAL
Qty: 0 | Refills: 0 | DISCHARGE
Start: 2019-07-07

## 2019-07-07 RX ADMIN — Medication 0.5 MILLIGRAM(S): at 08:49

## 2019-07-07 RX ADMIN — Medication 0.5 MILLIGRAM(S): at 12:27

## 2019-07-07 RX ADMIN — SERTRALINE 100 MILLIGRAM(S): 25 TABLET, FILM COATED ORAL at 08:49

## 2019-07-07 RX ADMIN — DEXTROAMPHETAMINE SACCHARATE, AMPHETAMINE ASPARTATE, DEXTROAMPHETAMINE SULFATE AND AMPHETAMINE SULFATE 10 MILLIGRAM(S): 1.875; 1.875; 1.875; 1.875 TABLET ORAL at 11:48

## 2019-07-07 RX ADMIN — DEXTROAMPHETAMINE SACCHARATE, AMPHETAMINE ASPARTATE, DEXTROAMPHETAMINE SULFATE AND AMPHETAMINE SULFATE 10 MILLIGRAM(S): 1.875; 1.875; 1.875; 1.875 TABLET ORAL at 08:49

## 2019-07-07 RX ADMIN — Medication 1 MILLIGRAM(S): at 15:14

## 2019-07-07 RX ADMIN — Medication 40 MILLIGRAM(S): at 08:49

## 2019-07-07 RX ADMIN — BUPROPION HYDROCHLORIDE 300 MILLIGRAM(S): 150 TABLET, EXTENDED RELEASE ORAL at 08:49

## 2022-03-21 ENCOUNTER — EMERGENCY (EMERGENCY)
Facility: HOSPITAL | Age: 37
LOS: 1 days | Discharge: HOME | End: 2022-03-21
Attending: EMERGENCY MEDICINE | Admitting: EMERGENCY MEDICINE
Payer: MEDICAID

## 2022-03-21 VITALS
HEART RATE: 79 BPM | RESPIRATION RATE: 17 BRPM | SYSTOLIC BLOOD PRESSURE: 133 MMHG | OXYGEN SATURATION: 99 % | DIASTOLIC BLOOD PRESSURE: 82 MMHG

## 2022-03-21 VITALS
OXYGEN SATURATION: 99 % | TEMPERATURE: 98 F | RESPIRATION RATE: 17 BRPM | WEIGHT: 119.93 LBS | HEIGHT: 64 IN | SYSTOLIC BLOOD PRESSURE: 134 MMHG | HEART RATE: 86 BPM | DIASTOLIC BLOOD PRESSURE: 81 MMHG

## 2022-03-21 LAB
ALBUMIN SERPL ELPH-MCNC: 3.5 G/DL — SIGNIFICANT CHANGE UP (ref 3.4–5)
ALP SERPL-CCNC: 73 U/L — SIGNIFICANT CHANGE UP (ref 40–120)
ALT FLD-CCNC: 17 U/L — SIGNIFICANT CHANGE UP (ref 12–42)
ANION GAP SERPL CALC-SCNC: 7 MMOL/L — LOW (ref 9–16)
APTT BLD: 30.9 SEC — SIGNIFICANT CHANGE UP (ref 27.5–35.5)
AST SERPL-CCNC: 26 U/L — SIGNIFICANT CHANGE UP (ref 15–37)
BASOPHILS # BLD AUTO: 0.03 K/UL — SIGNIFICANT CHANGE UP (ref 0–0.2)
BASOPHILS NFR BLD AUTO: 0.5 % — SIGNIFICANT CHANGE UP (ref 0–2)
BILIRUB SERPL-MCNC: 0.2 MG/DL — SIGNIFICANT CHANGE UP (ref 0.2–1.2)
BUN SERPL-MCNC: 12 MG/DL — SIGNIFICANT CHANGE UP (ref 7–23)
CALCIUM SERPL-MCNC: 8.4 MG/DL — LOW (ref 8.5–10.5)
CHLORIDE SERPL-SCNC: 106 MMOL/L — SIGNIFICANT CHANGE UP (ref 96–108)
CO2 SERPL-SCNC: 29 MMOL/L — SIGNIFICANT CHANGE UP (ref 22–31)
CREAT SERPL-MCNC: 0.68 MG/DL — SIGNIFICANT CHANGE UP (ref 0.5–1.3)
EGFR: 117 ML/MIN/1.73M2 — SIGNIFICANT CHANGE UP
EOSINOPHIL # BLD AUTO: 0.38 K/UL — SIGNIFICANT CHANGE UP (ref 0–0.5)
EOSINOPHIL NFR BLD AUTO: 6.8 % — HIGH (ref 0–6)
ETHANOL SERPL-MCNC: 170 MG/DL — HIGH
GLUCOSE SERPL-MCNC: 83 MG/DL — SIGNIFICANT CHANGE UP (ref 70–99)
HCT VFR BLD CALC: 34.5 % — SIGNIFICANT CHANGE UP (ref 34.5–45)
HGB BLD-MCNC: 11.8 G/DL — SIGNIFICANT CHANGE UP (ref 11.5–15.5)
IMM GRANULOCYTES NFR BLD AUTO: 0.2 % — SIGNIFICANT CHANGE UP (ref 0–1.5)
INR BLD: 0.91 — SIGNIFICANT CHANGE UP (ref 0.88–1.16)
LYMPHOCYTES # BLD AUTO: 2.19 K/UL — SIGNIFICANT CHANGE UP (ref 1–3.3)
LYMPHOCYTES # BLD AUTO: 39.4 % — SIGNIFICANT CHANGE UP (ref 13–44)
MAGNESIUM SERPL-MCNC: 1.9 MG/DL — SIGNIFICANT CHANGE UP (ref 1.6–2.6)
MCHC RBC-ENTMCNC: 31.4 PG — SIGNIFICANT CHANGE UP (ref 27–34)
MCHC RBC-ENTMCNC: 34.2 GM/DL — SIGNIFICANT CHANGE UP (ref 32–36)
MCV RBC AUTO: 91.8 FL — SIGNIFICANT CHANGE UP (ref 80–100)
MONOCYTES # BLD AUTO: 0.69 K/UL — SIGNIFICANT CHANGE UP (ref 0–0.9)
MONOCYTES NFR BLD AUTO: 12.4 % — SIGNIFICANT CHANGE UP (ref 2–14)
NEUTROPHILS # BLD AUTO: 2.26 K/UL — SIGNIFICANT CHANGE UP (ref 1.8–7.4)
NEUTROPHILS NFR BLD AUTO: 40.7 % — LOW (ref 43–77)
NRBC # BLD: 0 /100 WBCS — SIGNIFICANT CHANGE UP (ref 0–0)
PLATELET # BLD AUTO: 228 K/UL — SIGNIFICANT CHANGE UP (ref 150–400)
POTASSIUM SERPL-MCNC: 3.8 MMOL/L — SIGNIFICANT CHANGE UP (ref 3.5–5.3)
POTASSIUM SERPL-SCNC: 3.8 MMOL/L — SIGNIFICANT CHANGE UP (ref 3.5–5.3)
PROT SERPL-MCNC: 6.7 G/DL — SIGNIFICANT CHANGE UP (ref 6.4–8.2)
PROTHROM AB SERPL-ACNC: 10.6 SEC — SIGNIFICANT CHANGE UP (ref 10.5–13.4)
RBC # BLD: 3.76 M/UL — LOW (ref 3.8–5.2)
RBC # FLD: 12.6 % — SIGNIFICANT CHANGE UP (ref 10.3–14.5)
SARS-COV-2 RNA SPEC QL NAA+PROBE: SIGNIFICANT CHANGE UP
SODIUM SERPL-SCNC: 142 MMOL/L — SIGNIFICANT CHANGE UP (ref 132–145)
WBC # BLD: 5.56 K/UL — SIGNIFICANT CHANGE UP (ref 3.8–10.5)
WBC # FLD AUTO: 5.56 K/UL — SIGNIFICANT CHANGE UP (ref 3.8–10.5)

## 2022-03-21 PROCEDURE — 70496 CT ANGIOGRAPHY HEAD: CPT | Mod: 26

## 2022-03-21 PROCEDURE — 70498 CT ANGIOGRAPHY NECK: CPT | Mod: 26

## 2022-03-21 PROCEDURE — 99291 CRITICAL CARE FIRST HOUR: CPT

## 2022-03-21 RX ORDER — SODIUM CHLORIDE 9 MG/ML
1000 INJECTION INTRAMUSCULAR; INTRAVENOUS; SUBCUTANEOUS ONCE
Refills: 0 | Status: COMPLETED | OUTPATIENT
Start: 2022-03-21 | End: 2022-03-21

## 2022-03-21 RX ORDER — MORPHINE SULFATE 50 MG/1
2 CAPSULE, EXTENDED RELEASE ORAL ONCE
Refills: 0 | Status: DISCONTINUED | OUTPATIENT
Start: 2022-03-21 | End: 2022-03-21

## 2022-03-21 RX ADMIN — MORPHINE SULFATE 2 MILLIGRAM(S): 50 CAPSULE, EXTENDED RELEASE ORAL at 05:59

## 2022-03-21 RX ADMIN — Medication 1 MILLIGRAM(S): at 05:59

## 2022-03-21 RX ADMIN — SODIUM CHLORIDE 2000 MILLILITER(S): 9 INJECTION INTRAMUSCULAR; INTRAVENOUS; SUBCUTANEOUS at 05:59

## 2022-03-21 NOTE — ED PROVIDER NOTE - PHYSICAL EXAMINATION
VITAL SIGNS: I have reviewed nursing notes and confirm.  CONSTITUTIONAL: Well-developed; thin, at times rocking back and forth, talking t herself, asking for Mr. Hansen and complaining of headache  SKIN: Skin is warm and dry, no acute rash.  HEAD: Normocephalic; atraumatic.  EYES: Pupils round bilaterally, 5-6 mm; conjunctiva and sclera clear.  ENT: No nasal discharge; airway clear, MMM.  NECK: Supple; non tender.  CARD: S1, S2 normal; no murmurs, gallops, or rubs. Regular rate and rhythm.  RESP: No wheezes, rales or rhonchi.  : No CVAT tenderness bilaterally   ABD: Soft; non-distended; non-tender; no rebound or guarding.  EXT: Normal ROM. No clubbing, cyanosis or edema.  NEURO: Alert, oriented. BALLESTEROS, normal tone, no gross motor or sensory changes. Fluent speech.  PSYCH: Cooperative, distressed, appears to be a mixture of intox with Autism type behaviors. Difficult to get to answer questions or focus.

## 2022-03-21 NOTE — ED ADULT NURSE NOTE - NSIMPLEMENTINTERV_GEN_ALL_ED
Implemented All Fall Risk Interventions:  [place] to call system. Call bell, personal items and telephone within reach. Instruct patient to call for assistance. Room bathroom lighting operational. Non-slip footwear when patient is off stretcher. Physically safe environment: no spills, clutter or unnecessary equipment. Stretcher in lowest position, wheels locked, appropriate side rails in place. Provide visual cue, wrist band, yellow gown, etc. Monitor gait and stability. Monitor for mental status changes and reorient to person, place, and time. Review medications for side effects contributing to fall risk. Reinforce activity limits and safety measures with patient and family. Implemented All Fall Risk Interventions:  [place] to call system. Call bell, personal items and telephone within reach. Instruct patient to call for assistance. Room bathroom lighting operational. Non-slip footwear when patient is off stretcher. Physically safe environment: no spills, clutter or unnecessary equipment. Stretcher in lowest position, wheels locked, appropriate side rails in place. Provide visual cue, wrist band, yellow gown, etc. Monitor gait and stability. Monitor for mental status changes and reorient to person, place, and time. Review medications for side effects contributing to fall risk. Reinforce activity limits and safety measures with patient and family. Implemented All Fall Risk Interventions:  [place] to call system. Call bell, personal items and telephone within reach. Instruct patient to call for assistance. Room bathroom lighting operational. Non-slip footwear when patient is off stretcher. Physically safe environment: no spills, clutter or unnecessary equipment. Stretcher in lowest position, wheels locked, appropriate side rails in place. Provide visual cue, wrist band, yellow gown, etc. Monitor gait and stability. Monitor for mental status changes and reorient to person, place, and time. Review medications for side effects contributing to fall risk. Reinforce activity limits and safety measures with patient and family.

## 2022-03-21 NOTE — ED ADULT NURSE REASSESSMENT NOTE - NS ED NURSE REASSESS COMMENT FT1
Pt awake and orient, ready for dispo. Pt has clear and safe discharge. Md aware.
Pt rcvd from [facility]. Pt sleeping on assessment. VSS. Arousable to verbal stimuli. Pupils 3mm. COVID swab sent. Bed alarm in place. Safety and comfort measures maintained.

## 2022-03-21 NOTE — ED PROVIDER NOTE - CLINICAL SUMMARY MEDICAL DECISION MAKING FREE TEXT BOX
Patient presenting with AMS= possibly 2/2 drinking. + Autism and severe headache with dilated pupils (denies drugs). Will check UDS, labs and STAT CT Head.

## 2022-03-21 NOTE — ED PROVIDER NOTE - PATIENT PORTAL LINK FT
You can access the FollowMyHealth Patient Portal offered by [facility] by registering at the following website: [email or web address]. By joining [employer]’s FollowMyHealth portal, you will also be able to view your health information using other applications (apps) compatible with our system. You can access the FollowMyHealth Patient Portal offered by [facility] by registering at the following website: [email or web address]. By joining [employer]’s FollowMyHealth portal, you will also be able to view your health information using other applications (apps) compatible with our system. You can access the FollowMyHealth Patient Portal offered by [facility] by registering at the following website: [email or web address]. By joining [employer]’s FollowMyHealth portal, you will also be able to view your health information using other applications (apps) compatible with our system.

## 2022-03-21 NOTE — ED ADULT TRIAGE NOTE - NS ED NURSE AMBULANCES
[facility]

## 2022-03-21 NOTE — ED ADULT NURSE NOTE - NS ED NURSE DISCH DISPOSITION
"/89 (BP Location: Right arm, Patient Position: Sitting, Cuff Size: Adult Large)   Pulse 84   Temp 98.3  F (36.8  C) (Oral)   Resp 18   Ht 1.695 m (5' 6.75\")   Wt 126 kg (277 lb 11.2 oz)   SpO2 96%   BMI 43.82 kg/m    Patient is here for a physical and to establish care.  " Discharged
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
## 2022-03-21 NOTE — ED PROVIDER NOTE - OBJECTIVE STATEMENT
36 F BIBE from a a bar, she was apparently found on the floor with "2 guys" who didn't seem to know much about her. Per EMS she was repeating that it was "hot", she apparently had sz like activity and immediately after asked for a "puppy". Apparently goes by "Alton".    In the ED she was initially laying quietly on the stretcher then began rocking back and forth rhythmically in a way that did not look like a seizure. She was bantering her head with her hands says "hurts" over and over. She says that she just had 3 [place] lights. Denies drugs. She was very difficult to communicate with. She would nt not say if she fell.     She then said "Autism, light and sound sensitivity, migraines, \screen sensitivity" and asked for Mr. Hansen and began rockign and suckign her thumb.     She was taken for a STAT CT Head based on sx and dilated pupils to 5-6mm. Given IV Ativan 1mg and Morphine 2mg. 36 F BIBE from a a bar, she was apparently found on the floor with "2 guys" who didn't seem to know much about her. Per EMS she was repeating that it was "hot", she apparently had sz like activity and immediately after asked for a "puppy". Apparently goes by "Alton".    In the ED she was initially laying quietly on the stretcher then began rocking back and forth rhythmically in a way that did not look like a seizure. She was bantering her head with her hands says "hurts" over and over. She says that she just had 3 [place] lights. Denies drugs. She was very difficult to communicate with. She would nt not say if she fell.     She then said "Autism, light and sound sensitivity, migraines, \screen sensitivity" and asked for Mr. Hansen and began rockign and suckign her thumb.     She was taken for a STAT CT Head based on sx and dilated pupils to 5-6mm. Given IV Ativan 1mg and Morphine 2mg. 36 F BIBE from a a bar, she was apparently found on the floor with "2 guys" who didn't seem to know much about her. Per EMS she was repeating that it was "hot", she apparently had sz like activity and immediately after asked for a "puppy". Apparently goes by "Alton".    In the ED she was initially laying quietly on the stretcher then began rocking back and forth rhythmically in a way that did not look like a seizure. She was bantering her head with her hands says "hurts" over and over. She says that she just had 3 [place] lights. Denies drugs. She was very difficult to communicate with. She would nt not say if she fell.     She then said "Autism, light and sound sensitivity, migraines, \screen sensitivity" and asked for Mr. Hansen and began rockign and suckign her thumb.     She was taken for a STAT CT Head based on sx and dilated pupils to 5-6mm. Given IV Ativan 1mg and Morphine 2mg.

## 2022-03-21 NOTE — ED PROVIDER NOTE - CRITICAL CARE ATTENDING CONTRIBUTION TO CARE
The patient was seen immediately upon arrival due to a high probability of imminent or life-threatening deterioration secondary to AMS with severe headache, which required my direct attention, intervention, and personal management at the bedside. I have personally provided critical care time exclusive of time spent on separately billable procedures. Time includes review of laboratory data, radiology results, discussion with consultants, discussion with the patient's family, and monitoring for potential decompensation.

## 2022-03-21 NOTE — ED PROVIDER NOTE - PROGRESS NOTE DETAILS
Received care from Dr. Miller at 8 am.  Treated with IV ativan at 6 am.  On initial reassessment resting comfortably.  Patient now ambulatory and able to articulate her PMH and medication use which includes Benzo/SSRI.  Cannot recall events which brought her to the ED.  Lives in [facility] with boyfriend.  Has PCP at [facility].  No acute complaints at this time.  Will discharge from the ED, suspect alcohol + polypharmacy. Received care from Dr. Miller at 8 am.  Treated with IV ativan at 6 am.  On initial reassessment resting comfortably.  Patient now ambulatory and able to articulate her PMH and medication use which includes Benzo/SSRI.  Cannot recall events which brought her to the ED.  Lives in [facility] with boyfriend.  Has PCP at [facility].  No acute complaints at this time.  Will discharge from the ED, suspect alcohol + polypharmacy. Received care from Dr. Miller at 8 am.  Treated with IV ativan at 6 am.  On initial reassessment resting comfortably.  Patient now ambulatory and able to articulate her PMH and medication use which includes Benzo/SSRI.  Cannot recall events which brought her to the ED.  Lives in [place] with boyfriend.  Has PCP at [facility].  No acute complaints at this time.  Will discharge from the ED, suspect alcohol + polypharmacy.

## 2022-03-21 NOTE — ED ADULT TRIAGE NOTE - CHIEF COMPLAINT QUOTE
Pt found on floor at bar w/ 2 guys. EMS unsure if they were pt's friends or not as the guys didn't seem to know much about the patient. No head trauma noted. No head injuries noted.  Pt no responding to assessment or name, just keeps repeating "its hot." Bilateral pupils UTO further information.  Pt had seizure-like activity, after which she immediately awoke and asked for "puppy" and stated her name is "Alton"  MD was present at bedside. Pt found on floor at bar w/ 2 guys. EMS unsure if they were pt's friends or not as the guys didn't seem to know much about the patient. No head trauma noted. No head injuries noted.  Pt no responding to assessment or name, just keeps repeating "its hot." Bilateral pupils. As per EMS pt has a psych hx and PSA. Takes Wellbutrin, Lexapro, Zoloft, inderal, and Adderall. UTO further information.  Pt had seizure-like activity, after which she immediately awoke and asked for "puppy" and stated her name is "Alton"  MD was present at bedside.

## 2022-03-21 NOTE — ED ADULT NURSE NOTE - OBJECTIVE STATEMENT
36y female BIBEMS for AMS. Pt was found at bar w/ 2 guys (unclear whether friends of pt). On assessment pt repeating "it hurts," "its hot," pt scratching arms and neck. Pt had seizure-like activity, witnessed by RN and MD, pt awoke and immediately asked for "Mr. valdez." Pt stated she has autism, migraines, and photophobia. Pt pointing to her head and crying saying shes having a really bad headache, feels worse than her migraines. Pt's pupils dilated equal and reactive. Pt not confirming if she took drugs or drank alcohol. Pt placed on full cardiac monitor. Bed alarms active and audible.

## 2022-03-23 DIAGNOSIS — F84.0 AUTISTIC DISORDER: ICD-10-CM

## 2022-03-23 DIAGNOSIS — R41.82 ALTERED MENTAL STATUS, UNSPECIFIED: ICD-10-CM

## 2022-03-23 DIAGNOSIS — R51.9 HEADACHE, UNSPECIFIED: ICD-10-CM

## 2022-03-23 DIAGNOSIS — Z20.822 CONTACT WITH AND (SUSPECTED) EXPOSURE TO COVID-19: ICD-10-CM

## 2023-03-10 NOTE — ED ADULT TRIAGE NOTE - AS TEMP SITE
Go for blood tests as directed. Your doctor will do lab tests at regular visits to monitor the effects of this medicine. Please follow up with your doctor and keep your health care provider appointments. axillary

## 2023-05-08 NOTE — ED ADULT TRIAGE NOTE - NS_BHTRGCALCULATEDSCORE_ED_A_ED_FT
Spoke to patient and advised he needs to stop in clinic to sign form to release information to [facility]. MD filled out form but we cannot send it in until he signs release. Patient busy at time of call and will call back.   4

## 2024-01-01 NOTE — ED ADULT NURSE NOTE - SUICIDE SCREENING QUESTION 3
-Write Down: How many feedings, wet diapers and dirty diapers until seen by your Pediatrician. Patient unable to complete

## 2024-07-01 PROCEDURE — ZZZZZ: CPT

## 2024-11-19 NOTE — ED PROVIDER NOTE - NS ED MD TWO NIGHTS YN
Chief Complaint   Patient presents with    New Patient     New ILD        Vitals were taken, medications reconciled.    Ghanshyam Guzman, Clinic Assistant   10:15 AM    
Yes